# Patient Record
(demographics unavailable — no encounter records)

---

## 2024-10-16 NOTE — HISTORY OF PRESENT ILLNESS
[de-identified] : Chandrakant reports that last week he had bad headaches, sinus pressure and pain in his inner ears- right>left. The symptoms were constant throughout the day and got better at night when he was lying down. He started Xyzal and Advil. Tried to get an appointment here but there were none available until today. Went to PCP and was told it was migraines. He has never had migraines in the past. Gradually his symptoms are improving. This is his 2nd sinus issue in the past 5 weeks. Had sinus pressure in beginning of September which lasted 1 week and symptoms fully resolved. Then the pressure and ear pain returned approximately 2 weeks later. Presently, he reports his symptoms are 70% improved. Uses a mouthpiece for TMJ which he feels is helping.

## 2024-10-16 NOTE — PHYSICAL EXAM
[Alert] : alert [Healthy Appearance] : healthy appearance [No Acute Distress] : no acute distress [Normal Pupil & Iris Size/Symmetry] : normal pupil and iris size and symmetry [No Discharge] : no discharge [Sclera Not Icteric] : sclera not icteric [Normal TMs] : both tympanic membranes were normal [Normal Nasal Mucosa] : the nasal mucosa was normal [Normal Lips/Tongue] : the lips and tongue were normal [Normal Outer Ear/Nose] : the ears and nose were normal in appearance [No Nasal Discharge] : no nasal discharge [No Thrush] : no thrush [Normal Rate and Effort] : normal respiratory rhythm and effort [No Crackles] : no crackles [No Retractions] : no retractions [Bilateral Audible Breath Sounds] : bilateral audible breath sounds [Normal Rate] : heart rate was normal  [Normal S1, S2] : normal S1 and S2 [No murmur] : no murmur [Regular Rhythm] : with a regular rhythm [Skin Intact] : skin intact  [No Rash] : no rash [No Skin Lesions] : no skin lesions [Normal Mood] : mood was normal [Normal Affect] : affect was normal [Judgment and Insight Age Appropriate] : judgement and insight is age appropriate [de-identified] : 1+ left submandibular LN

## 2024-10-16 NOTE — ASSESSMENT
[FreeTextEntry1] : Resolving sinusitis Allergic Rhinitis Saline rinses daily Nasacort 2 sprays each nostril QD Xyzal 5mg QHS

## 2024-10-16 NOTE — PHYSICAL EXAM
[Alert] : alert [Healthy Appearance] : healthy appearance [No Acute Distress] : no acute distress [Normal Pupil & Iris Size/Symmetry] : normal pupil and iris size and symmetry [No Discharge] : no discharge [Sclera Not Icteric] : sclera not icteric [Normal TMs] : both tympanic membranes were normal [Normal Nasal Mucosa] : the nasal mucosa was normal [Normal Lips/Tongue] : the lips and tongue were normal [Normal Outer Ear/Nose] : the ears and nose were normal in appearance [No Nasal Discharge] : no nasal discharge [No Thrush] : no thrush [Normal Rate and Effort] : normal respiratory rhythm and effort [No Crackles] : no crackles [No Retractions] : no retractions [Bilateral Audible Breath Sounds] : bilateral audible breath sounds [Normal Rate] : heart rate was normal  [Normal S1, S2] : normal S1 and S2 [No murmur] : no murmur [Regular Rhythm] : with a regular rhythm [Skin Intact] : skin intact  [No Rash] : no rash [No Skin Lesions] : no skin lesions [Normal Mood] : mood was normal [Normal Affect] : affect was normal [Judgment and Insight Age Appropriate] : judgement and insight is age appropriate [de-identified] : 1+ left submandibular LN

## 2024-10-16 NOTE — HISTORY OF PRESENT ILLNESS
[de-identified] : Chandrakant reports that last week he had bad headaches, sinus pressure and pain in his inner ears- right>left. The symptoms were constant throughout the day and got better at night when he was lying down. He started Xyzal and Advil. Tried to get an appointment here but there were none available until today. Went to PCP and was told it was migraines. He has never had migraines in the past. Gradually his symptoms are improving. This is his 2nd sinus issue in the past 5 weeks. Had sinus pressure in beginning of September which lasted 1 week and symptoms fully resolved. Then the pressure and ear pain returned approximately 2 weeks later. Presently, he reports his symptoms are 70% improved. Uses a mouthpiece for TMJ which he feels is helping.